# Patient Record
Sex: FEMALE | Race: WHITE
[De-identification: names, ages, dates, MRNs, and addresses within clinical notes are randomized per-mention and may not be internally consistent; named-entity substitution may affect disease eponyms.]

---

## 2018-08-30 ENCOUNTER — HOSPITAL ENCOUNTER (EMERGENCY)
Dept: HOSPITAL 36 - ER | Age: 33
Discharge: HOME | End: 2018-08-30
Payer: MEDICAID

## 2018-08-30 DIAGNOSIS — B34.9: Primary | ICD-10-CM

## 2018-08-30 DIAGNOSIS — D72.829: ICD-10-CM

## 2018-08-30 DIAGNOSIS — J44.9: ICD-10-CM

## 2018-08-30 DIAGNOSIS — F17.200: ICD-10-CM

## 2018-08-30 LAB
ANION GAP SERPL CALC-SCNC: 10.2 MMOL/L (ref 7–16)
APPEARANCE UR: (no result)
BACTERIA #/AREA URNS HPF: (no result) /HPF
BASOPHILS # BLD AUTO: 0 TH/CUMM (ref 0–0.2)
BASOPHILS NFR BLD AUTO: 0.1 % (ref 0–2)
BILIRUB UR-MCNC: NEGATIVE MG/DL
BUN SERPL-MCNC: 10 MG/DL (ref 7–25)
CALCIUM SERPL-MCNC: 9.6 MG/DL (ref 8.6–10.3)
CHLORIDE SERPL-SCNC: 103 MEQ/L (ref 98–107)
CO2 SERPL-SCNC: 26.3 MEQ/L (ref 21–31)
COLOR UR: (no result)
CREAT SERPL-MCNC: 0.6 MG/DL (ref 0.6–1.2)
EOSINOPHIL # BLD AUTO: 0.2 TH/CMM (ref 0.1–0.4)
EOSINOPHIL NFR BLD AUTO: 1.2 % (ref 0–5)
EPI CELLS URNS QL MICRO: (no result) /LPF
ERYTHROCYTE [DISTWIDTH] IN BLOOD BY AUTOMATED COUNT: 11.4 % (ref 11.5–20)
FLUAV AG NPH QL IA: (no result)
FLUBV AG NPH QL IA: (no result)
GLUCOSE SERPL-MCNC: 102 MG/DL (ref 70–105)
GLUCOSE UR STRIP-MCNC: NEGATIVE MG/DL
HCT VFR BLD CALC: 40.5 % (ref 41–60)
HGB BLD-MCNC: 13.6 GM/DL (ref 12–16)
KETONES UR STRIP-MCNC: NEGATIVE MG/DL
LEUKOCYTE ESTERASE UR-ACNC: (no result)
LYMPHOCYTE AB SER FC-ACNC: 1.8 TH/CMM (ref 1.5–3)
LYMPHOCYTES NFR BLD AUTO: 13.9 % (ref 20–50)
MAGNESIUM SERPL-MCNC: 1.8 MG/DL (ref 1.9–2.7)
MCH RBC QN AUTO: 31.7 PG (ref 27–31)
MCHC RBC AUTO-ENTMCNC: 33.6 PG (ref 28–36)
MCV RBC AUTO: 94.2 FL (ref 81–100)
MICRO URNS: YES
MONOCYTES # BLD AUTO: 0.8 TH/CMM (ref 0.3–1)
MONOCYTES NFR BLD AUTO: 6.3 % (ref 2–10)
NEUTROPHILS # BLD: 9.8 TH/CMM (ref 1.8–8)
NEUTROPHILS NFR BLD AUTO: 78.5 % (ref 40–80)
NITRITE UR QL STRIP: NEGATIVE
PH UR STRIP: 7 [PH] (ref 4.6–8)
PLATELET # BLD: 201 TH/CMM (ref 150–400)
PMV BLD AUTO: 8.6 FL
POTASSIUM SERPL-SCNC: 3.5 MEQ/L (ref 3.5–5.1)
PROT UR STRIP-MCNC: 30 MG/DL
RBC # BLD AUTO: 4.3 MIL/CMM (ref 3.8–5.1)
RBC # UR STRIP: (no result) /UL
RBC #/AREA URNS HPF: >100 /HPF (ref 0–5)
SODIUM SERPL-SCNC: 136 MEQ/L (ref 136–145)
SP GR UR STRIP: 1.02 (ref 1–1.03)
URINALYSIS COMPLETE PNL UR: (no result)
UROBILINOGEN UR STRIP-ACNC: 0.2 E.U./DL (ref 0.2–1)
WBC # BLD AUTO: 12.6 TH/CMM (ref 4.8–10.8)
WBC #/AREA URNS HPF: (no result) /HPF (ref 0–5)

## 2018-08-30 PROCEDURE — 83735 ASSAY OF MAGNESIUM: CPT

## 2018-08-30 PROCEDURE — 36415 COLL VENOUS BLD VENIPUNCTURE: CPT

## 2018-08-30 PROCEDURE — Z7502: HCPCS

## 2018-08-30 PROCEDURE — 87086 URINE CULTURE/COLONY COUNT: CPT

## 2018-08-30 PROCEDURE — 96376 TX/PRO/DX INJ SAME DRUG ADON: CPT

## 2018-08-30 PROCEDURE — 80048 BASIC METABOLIC PNL TOTAL CA: CPT

## 2018-08-30 PROCEDURE — 99284 EMERGENCY DEPT VISIT MOD MDM: CPT

## 2018-08-30 PROCEDURE — 81001 URINALYSIS AUTO W/SCOPE: CPT

## 2018-08-30 PROCEDURE — 87804 INFLUENZA ASSAY W/OPTIC: CPT

## 2018-08-30 PROCEDURE — 85025 COMPLETE CBC W/AUTO DIFF WBC: CPT

## 2018-08-30 PROCEDURE — 81025 URINE PREGNANCY TEST: CPT

## 2018-08-30 PROCEDURE — 96374 THER/PROPH/DIAG INJ IV PUSH: CPT

## 2018-08-30 NOTE — ED PHYSICIAN CHART
ED Chief Complaint/HPI





- Patient Information


Date Seen:: 08/30/18


Time Seen:: 08:40


Chief Complaint:: pressure in ears and head


History of Present Illness:: 





Patient developed pressure sensation in her ears and head, blurred vision and 

cough productive of yellow-green sputum yesterday.  8 days ago patient was at 

[facility] and diagnosed with a urinary tract infection and Bell's 

palsy.  She had an EKG and an MRI done.  LMP is current.


Allergies:: 


 Allergies











Allergy/AdvReac Type Severity Reaction Status Date / Time


 


No Known Allergies Allergy   Verified 08/30/18 08:37











Vitals:: 


 Vital Signs - 8 hr











  08/30/18





  08:37


 


Temp 99.0 F


 


HR 69


 


RR 18


 


/66


 


O2 Sat % 99











Historian:: Patient


Review:: Nurse's Note Reviewed





ED Review of Systems





- Review of Systems


General/Constitutional: No fever, No chills, No weight loss, No weakness, No 

diaphoresis, No edema, No loss of appetite


Skin: No skin lesions, No rash, No bruising


Head: No headache, No light-headedness


Eyes: No loss of vision, No pain, No diplopia, Other (blurred vision)


ENT: Earache, No nasal drainage, No sore throat, No tinnitus


Neck: No thyromegaly, No stiffness, No mass noted


Cardio Vascular: No PND, No orthopnea, No edema


Pulmonary: Cough, Sputum, No wheezing


GI: No nausea, No vomiting, No diarrhea, No pain, No melena, No hematochezia, 

No constipation, No hematemesis


G/U: No dysuria, No frequency, No hematuria


Musculoskeletal: No bone or joint pain, No back pain, No muscle pain


Endocrine: No polyuria, No polydipsia


Psychiatric: No prior psych history, No depression, No anxiety, No suicidal 

ideation


Hematopoietic: No bruising, No lymphadenopathy


Allergic/Immuno: No urticaria, No angioedema


Neurological: No syncope, No focal symptoms, No weakness, No paresthesia, No 

headache, No seizure, No dizziness, No confusion, No vertigo





ED Past Medical History





- Past Medical History


Past Medical History: Asthma/COPD, Other (migraine headaches; insomnia; 

gestational diabetes)


Family History: Diabetes Melitus, HTN


Social History: Smoker, Alcohol, Other (occasional smoking and occasional 

alcohol consumption)


Surgical History: other (right ovarian cyst)


Psychiatricy History: None





Family Medical History





- Family Member


  ** Mother


History Unknown: Yes


Ethnicity: 


Hx Family Hypertension: Yes





ED Physical Exam





- Physical Examination


General/Constitutional: Awake, Well-developed, well-nourished, Alert, No 

distress, GCS 15, Non-toxic appearing, Ambulatory


Head: Atraumatic


Eyes: Lids, conjuctiva normal, PERRL, EOMI


Other Eyes comments:: 





Optic disc sharp


Skin: Nl inspection, No rash, No skin lesions, No ecchymosis, Well hydrated, No 

lymphadenopathy


ENMT: External ears, nose nl, Nasal exam nl, Lips, teeth, gums nl


Other ENMT comments:: 





Right ceruminosis


Neck: Nontender, Full ROM w/o pain, No JVD, No nuchal rigidity, No bruit, No 

mass, No stridor


Respiratory: Nl effort/Exclusion, Clear to Auscultation, No Wheeze/Rhonchi/Rales


Other Respiratory comments:: 





1 out of 4 diffuse harsh breath sounds


Cardio Vascular: RRR, No murmur, gallop, rubs, NL S1 S2


GI: No tenderness/rebounding/guarding, No organomegaly, No hernia, Normal BS's, 

Nondistended, No mass/bruits, No McBurney tenderness


: No CVA tenderness


Extremities: No tenderness or effusion, Full ROM, normal strength in all 

extremities, No edema, Normal digits & nails


Neuro/Psych: Alert/oriented, DTR's symmetric, Normal sensory exam, Normal motor 

strength, Judgement/insight normal, Mood normal, Normal gait, No focal deficits


Misc: Normal back, No paraspinal tenderness





ED Assessment





- Assessment


General Assessment: 





After the first 15 mg of Toradol intravenously patient's pain had decreased to 

a 6 out of 10.  She felt even better after the second 15 mg of Toradol 

intravenously.





ED Septic Shock





- .


Is Septic Shock (SBP<90, OR Lactate>4 mmol\L) present?: No





- <6hrs of presentation:


Vital Signs: 


 Vital Signs - 8 hr











  08/30/18





  08:37


 


Temp 99.0 F


 


HR 69


 


RR 18


 


/66


 


O2 Sat % 99














ED Reassessment (Disposition)





- Reassessment


Reassessment Condition:: Improved





- Diagnosis


Diagnosis:: 





Acute viral syndrome; leukocytosis





- Aftercare/Follow up Instructions


Aftercare/Follow-Up Instructions:: Refer to Discharge Instructions





- Patient Disposition


Discharge/Transfer:: Home


Condition at Disposition:: Stable, Improved

## 2019-02-27 ENCOUNTER — HOSPITAL ENCOUNTER (EMERGENCY)
Dept: HOSPITAL 36 - ER | Age: 34
Discharge: HOME | End: 2019-02-27
Payer: MEDICAID

## 2019-02-27 DIAGNOSIS — H66.90: ICD-10-CM

## 2019-02-27 DIAGNOSIS — J32.9: ICD-10-CM

## 2019-02-27 DIAGNOSIS — N39.0: ICD-10-CM

## 2019-02-27 DIAGNOSIS — G44.1: ICD-10-CM

## 2019-02-27 DIAGNOSIS — K52.9: Primary | ICD-10-CM

## 2019-02-27 DIAGNOSIS — J44.9: ICD-10-CM

## 2019-02-27 LAB
ALBUMIN SERPL-MCNC: 4.7 GM/DL (ref 3.7–5.3)
ALBUMIN/GLOB SERPL: 1.8 {RATIO} (ref 1–1.8)
ALP SERPL-CCNC: 50 U/L (ref 34–104)
ALT SERPL-CCNC: 14 U/L (ref 7–52)
AMORPH SED URNS QL MICRO: (no result)
AMYLASE SERPL-CCNC: 13 U/L (ref 29–103)
ANION GAP SERPL CALC-SCNC: 13.2 MMOL/L (ref 7–16)
APPEARANCE UR: CLEAR
AST SERPL-CCNC: 15 U/L (ref 13–39)
BACTERIA #/AREA URNS HPF: (no result) /HPF
BASOPHILS # BLD AUTO: 0.1 TH/CUMM (ref 0–0.2)
BASOPHILS NFR BLD AUTO: 0.6 % (ref 0–2)
BILIRUB SERPL-MCNC: 0.2 MG/DL (ref 0.3–1)
BILIRUB UR-MCNC: NEGATIVE MG/DL
BUN SERPL-MCNC: 8 MG/DL (ref 7–25)
CALCIUM SERPL-MCNC: 9.4 MG/DL (ref 8.6–10.3)
CHLORIDE SERPL-SCNC: 101 MEQ/L (ref 98–107)
CO2 SERPL-SCNC: 28.4 MEQ/L (ref 21–31)
COLOR UR: YELLOW
CREAT SERPL-MCNC: 0.6 MG/DL (ref 0.6–1.2)
EOSINOPHIL # BLD AUTO: 0 TH/CMM (ref 0.1–0.4)
EOSINOPHIL NFR BLD AUTO: 0.3 % (ref 0–5)
EPI CELLS URNS QL MICRO: (no result) /LPF
ERYTHROCYTE [DISTWIDTH] IN BLOOD BY AUTOMATED COUNT: 11.9 % (ref 11.5–20)
GLOBULIN SER-MCNC: 2.6 GM/DL
GLUCOSE SERPL-MCNC: 100 MG/DL (ref 70–105)
GLUCOSE UR STRIP-MCNC: NEGATIVE MG/DL
HCT VFR BLD CALC: 41 % (ref 41–60)
HGB BLD-MCNC: 13.7 GM/DL (ref 12–16)
KETONES UR STRIP-MCNC: NEGATIVE MG/DL
LEUKOCYTE ESTERASE UR-ACNC: NEGATIVE
LIPASE SERPL-CCNC: 15 U/L (ref 11–82)
LYMPHOCYTE AB SER FC-ACNC: 2.3 TH/CMM (ref 1.5–3)
LYMPHOCYTES NFR BLD AUTO: 27.1 % (ref 20–50)
MCH RBC QN AUTO: 31.2 PG (ref 27–31)
MCHC RBC AUTO-ENTMCNC: 33.5 PG (ref 28–36)
MCV RBC AUTO: 93.2 FL (ref 81–100)
MICRO URNS: YES
MONOCYTES # BLD AUTO: 0.4 TH/CMM (ref 0.3–1)
MONOCYTES NFR BLD AUTO: 4.6 % (ref 2–10)
NEUTROPHILS # BLD: 5.8 TH/CMM (ref 1.8–8)
NEUTROPHILS NFR BLD AUTO: 67.4 % (ref 40–80)
NITRITE UR QL STRIP: NEGATIVE
PH UR STRIP: 7.5 [PH] (ref 4.6–8)
PLATELET # BLD: 167 TH/CMM (ref 150–400)
PMV BLD AUTO: 8.1 FL
POTASSIUM SERPL-SCNC: 3.6 MEQ/L (ref 3.5–5.1)
PROT UR STRIP-MCNC: NEGATIVE MG/DL
RBC # BLD AUTO: 4.4 MIL/CMM (ref 3.8–5.1)
RBC # UR STRIP: NEGATIVE /UL
RBC #/AREA URNS HPF: (no result) /HPF (ref 0–5)
SODIUM SERPL-SCNC: 139 MEQ/L (ref 136–145)
SP GR UR STRIP: 1.01 (ref 1–1.03)
URINALYSIS COMPLETE PNL UR: (no result)
UROBILINOGEN UR STRIP-ACNC: 0.2 E.U./DL (ref 0.2–1)
WBC # BLD AUTO: 8.6 TH/CMM (ref 4.8–10.8)
WBC #/AREA URNS HPF: (no result) /HPF (ref 0–5)

## 2019-02-27 PROCEDURE — 83690 ASSAY OF LIPASE: CPT

## 2019-02-27 PROCEDURE — 96365 THER/PROPH/DIAG IV INF INIT: CPT

## 2019-02-27 PROCEDURE — 85025 COMPLETE CBC W/AUTO DIFF WBC: CPT

## 2019-02-27 PROCEDURE — 81001 URINALYSIS AUTO W/SCOPE: CPT

## 2019-02-27 PROCEDURE — 84484 ASSAY OF TROPONIN QUANT: CPT

## 2019-02-27 PROCEDURE — 99284 EMERGENCY DEPT VISIT MOD MDM: CPT

## 2019-02-27 PROCEDURE — Z7502: HCPCS

## 2019-02-27 PROCEDURE — 93005 ELECTROCARDIOGRAM TRACING: CPT

## 2019-02-27 PROCEDURE — 36415 COLL VENOUS BLD VENIPUNCTURE: CPT

## 2019-02-27 PROCEDURE — 80053 COMPREHEN METABOLIC PANEL: CPT

## 2019-02-27 PROCEDURE — 82150 ASSAY OF AMYLASE: CPT

## 2019-02-27 PROCEDURE — 87086 URINE CULTURE/COLONY COUNT: CPT

## 2019-02-27 PROCEDURE — 96375 TX/PRO/DX INJ NEW DRUG ADDON: CPT

## 2019-02-27 PROCEDURE — 84703 CHORIONIC GONADOTROPIN ASSAY: CPT

## 2019-02-27 NOTE — ED PHYSICIAN CHART
ED Chief Complaint/HPI





- Patient Information


Date Seen:: 02/27/19


Time Seen:: 14:00


Chief Complaint:: Abdominal Pain


History of Present Illness:: 





onset x 2 days of intermittent, crampy, diffuse Abdominal Pain, N/V/D x 8; pt 

denies trauma, LOC, ALOC, AMS, H/As, S/T, neck pain, cough, C/P, SOB, A/C, VB, 

VD, fever, chills, or urinary s/s; LNMP: 2/23/19; pt denies pregnancy; pt is 

eating and urinating well; pt last urinated one hour PTA; pt has been Rx with 

Augmentin x one day for OM


Allergies:: 


 Allergies











Allergy/AdvReac Type Severity Reaction Status Date / Time


 


No Known Allergies Allergy   Verified 08/30/18 08:37











Vitals:: 


 Vital Signs - 8 hr











  02/27/19





  14:02


 


Temp 98.2 F


 


HR 68


 


RR 16


 


/66


 


O2 Sat % 96











Historian:: Patient


Review:: Nurse's Note Reviewed, Old Chart Reviewed





ED Review of Systems





- Review of Systems


General/Constitutional: No fever, No chills, No weight loss, No weakness, No 

diaphoresis, No edema, No loss of appetite


Skin: No skin lesions, No rash, No bruising


Head: Headache, No light-headedness


Eyes: No loss of vision, No pain, No diplopia


ENT: Earache, Nasal drainage, No sore throat, No tinnitus


Neck: No neck pain, No swelling, No thyromegaly, No stiffness, No mass noted


Cardio Vascular: No chest pain, No palpitations, No PND, No orthopnea, No edema


Pulmonary: No SOB, No cough, No sputum, No wheezing


GI: Nausea, Vomiting, Diarrhea, Pain, No melena, No hematochezia, No 

constipation, No hematemesis


G/U: No dysuria, No frequency, No hematuria, No nacturia


Ob/Gyn: No vaginal discharge, No abnormal vaginal bleed, No contraction


Musculoskeletal: No bone or joint pain, No back pain, No muscle pain


Endocrine: No polyuria, No polydipsia


Psychiatric: No prior psych history, No depression, No anxiety, No suicidal 

ideation, No homicidal ideation, No auditory hallucination, No visual 

hallucination


Hematopoietic: No bruising, No lymphadenopathy


Allergic/Immuno: No urticaria, No angioedema


Neurological: No syncope, No focal symptoms, No weakness, No paresthesia, 

Headache, No seizure, No dizziness, No confusion, No vertigo





ED Past Medical History





- Past Medical History


Obtainable: Yes


Past Medical History: Asthma/COPD, Other (Otitis Media; Sinusitis)


Family History: None


Social History: Non Smoker, No Alcohol, No Drug Use, Single, Employed


Surgical History: None


Psychiatricy History: None


Medication: Reviewed





Family Medical History





- Family Member


  ** Mother


History Unknown: Yes


Ethnicity: 


Hx Family Hypertension: Yes





ED Physical Exam





- Physical Examination


General/Constitutional: Awake, Well-developed, well-nourished, Alert, No 

distress, GCS 15, Non-toxic appearing, Ambulatory


Head: Atraumatic


Eyes: Lids, conjuctiva normal, PERRL, EOMI


Other Eyes comments:: 





PERRLA; Fundi: benign; EOMs: WNL


Skin: Nl inspection, No rash, No skin lesions, No ecchymosis, Well hydrated, No 

lymphadenopathy


ENMT: External ears, nose nl, Nasal exam nl, Lips, teeth, gums nl, Oropharynx nl

, Tonsils nl


Other ENMT comments:: 





Ears: TMs: dull and injected; + Maxillary Sinus Tenderness; + Nasal Congestion


Neck: Nontender, Full ROM w/o pain, No JVD, No nuchal rigidity, No bruit, No 

mass, No stridor


Other Neck comments:: 





supple; no meningeal signs; no cervical tenderness; no bruits


Respiratory: Nl effort/Exclusion, Clear to Auscultation, No Wheeze/Rhonchi/Rales


Cardio Vascular: RRR, No murmur, gallop, rubs, NL S1 S2, Carotid/Femoral/Distal 

pulses equal bilaterally


GI: No tenderness/rebounding/guarding, No organomegaly, No hernia, Normal BS's, 

Nondistended, No mass/bruits, No McBurney tenderness


Other GI comments:: 





no pulsatile masses


: No CVA tenderness


Extremities: No tenderness or effusion, Full ROM, normal strength in all 

extremities, No edema, Normal digits & nails


Neuro/Psych: Alert/oriented, DTR's symmetric, Normal sensory exam, Normal motor 

strength, Judgement/insight normal, Mood normal, Normal gait, No focal deficits


Other Neuro/Psych comments:: 





no focal signs


Misc: Normal back, No paraspinal tenderness





ED Labs/Radiology/EKG Results





- Lab Results


Results: 


 Laboratory Tests











  02/27/19 02/27/19 02/27/19





  14:15 14:15 14:15


 


WBC  8.6  


 


RBC  4.40  


 


Hgb  13.7  


 


Hct  41.0  


 


MCV  93.2  


 


MCH  31.2 H  


 


MCHC Differential  33.5  


 


RDW  11.9  


 


Plt Count  167  


 


MPV  8.1  


 


Neutrophils %  67.4  


 


Lymphocytes %  27.1  


 


Monocytes %  4.6  


 


Eosinophils %  0.3  


 


Basophils %  0.6  


 


Sodium   139 


 


Potassium   3.6 


 


Chloride   101 


 


Carbon Dioxide   28.4 


 


Anion Gap   13.2 


 


BUN   8 


 


Creatinine   0.6 


 


Est GFR ( Amer)   > 60.0 


 


Est GFR (Non-Af Amer)   > 60.0 


 


BUN/Creatinine Ratio   13.3 


 


Glucose   100 


 


Calcium   9.4 


 


Total Bilirubin   0.2 L 


 


AST   15 


 


ALT   14 


 


Alkaline Phosphatase   50 


 


Troponin I    < 0.01 L


 


Total Protein   7.3 


 


Albumin   4.7 


 


Globulin   2.6 


 


Albumin/Globulin Ratio   1.8 


 


Amylase   13 L 


 


Lipase   15 


 


Serum Pregnancy, Qual   


 


Urine Source   


 


Urine Color   


 


Urine Clarity   


 


Urine pH   


 


Ur Specific Gravity   


 


Urine Protein   


 


Urine Glucose (UA)   


 


Urine Ketones   


 


Urine Blood   


 


Urine Nitrate   


 


Urine Bilirubin   


 


Urine Urobilinogen   


 


Ur Leukocyte Esterase   


 


Urine RBC   


 


Urine WBC   


 


Ur Epithelial Cells   


 


Amorphous Sediment   


 


Urine Bacteria   














  02/27/19 02/27/19





  14:15 14:36


 


WBC  


 


RBC  


 


Hgb  


 


Hct  


 


MCV  


 


MCH  


 


MCHC Differential  


 


RDW  


 


Plt Count  


 


MPV  


 


Neutrophils %  


 


Lymphocytes %  


 


Monocytes %  


 


Eosinophils %  


 


Basophils %  


 


Sodium  


 


Potassium  


 


Chloride  


 


Carbon Dioxide  


 


Anion Gap  


 


BUN  


 


Creatinine  


 


Est GFR ( Amer)  


 


Est GFR (Non-Af Amer)  


 


BUN/Creatinine Ratio  


 


Glucose  


 


Calcium  


 


Total Bilirubin  


 


AST  


 


ALT  


 


Alkaline Phosphatase  


 


Troponin I  


 


Total Protein  


 


Albumin  


 


Globulin  


 


Albumin/Globulin Ratio  


 


Amylase  


 


Lipase  


 


Serum Pregnancy, Qual  NEGATIVE 


 


Urine Source   CLEAN C


 


Urine Color   YELLOW


 


Urine Clarity   CLEAR


 


Urine pH   7.5


 


Ur Specific Gravity   1.010


 


Urine Protein   NEGATIVE


 


Urine Glucose (UA)   NEGATIVE


 


Urine Ketones   NEGATIVE


 


Urine Blood   NEGATIVE


 


Urine Nitrate   NEGATIVE


 


Urine Bilirubin   NEGATIVE


 


Urine Urobilinogen   0.2


 


Ur Leukocyte Esterase   NEGATIVE


 


Urine RBC   0-2


 


Urine WBC   2-5


 


Ur Epithelial Cells   MODERATE


 


Amorphous Sediment   FEW PHOSPHATES


 


Urine Bacteria   FEW











Comments:: 





Reviewed





- Radiology Results


Comments:: 





deferred by pt





- EKG Interpretations


EKG Time:: 14:23


Rate & Rhythm: 75; NSR


Comments:: 





non-specific st-t changes





ED Septic Shock





- .


Is Septic Shock (SBP<90, OR Lactate>4 mmol\L) present?: No





- <6hrs of presentation:


Vital Signs: 


 Vital Signs - 8 hr











  02/27/19





  14:02


 


Temp 98.2 F


 


HR 68


 


RR 16


 


/66


 


O2 Sat % 96














ED Reassessment (Disposition)





- Reassessment


Reassessment:: 





pt tolerated po fluids well in ER; pt is asymptomatic upon discharge


Reassessment Condition:: Improved





- Diagnosis


Diagnosis:: 





Abdominal Pain; N/V/D; Abdominal Pain-Resolved; AGE; UTI; Otitis Media; Sinus 

Headaches; Fever; Sinusitis; Gastroenteritis; URI; Vascular Cephalgia





- Aftercare/Follow up Instructions


Aftercare/Follow-Up Instructions:: Counseled pt regarding lab results/diagnosis 

& need follow up, Refer to Discharge Instructions, Counseled pt & family 

regarding lab results/diagnosis & need follow up


Medication Prescribed:: 





Continue all medications as prescribed; Continue Augmentin as prescribed; Clear 

Liquid Diet; Fluids





- Patient Disposition


Discharge/Transfer:: Home


Condition at Disposition:: Stable, Improved (RTER prn if existing s/s reoccur 

and/or get worse and/or any other new s/s occur; ACIs given for all above Dx; 

Refer to GI/ Specialists/Ent Specialist/Internist ASAP; F/U with PMD in one 

day or prn; RTER prn if concerned)